# Patient Record
Sex: MALE | Race: OTHER | URBAN - METROPOLITAN AREA
[De-identification: names, ages, dates, MRNs, and addresses within clinical notes are randomized per-mention and may not be internally consistent; named-entity substitution may affect disease eponyms.]

---

## 2018-07-17 ENCOUNTER — EMERGENCY (EMERGENCY)
Facility: HOSPITAL | Age: 28
LOS: 1 days | Discharge: HOME | End: 2018-07-17
Attending: EMERGENCY MEDICINE
Payer: COMMERCIAL

## 2018-07-17 VITALS
RESPIRATION RATE: 18 BRPM | DIASTOLIC BLOOD PRESSURE: 94 MMHG | TEMPERATURE: 98 F | OXYGEN SATURATION: 100 % | SYSTOLIC BLOOD PRESSURE: 141 MMHG | HEART RATE: 72 BPM

## 2018-07-17 PROCEDURE — 99283 EMERGENCY DEPT VISIT LOW MDM: CPT

## 2018-07-17 PROCEDURE — 99283 EMERGENCY DEPT VISIT LOW MDM: CPT | Mod: 25

## 2018-07-17 NOTE — ED ADULT TRIAGE NOTE - CHIEF COMPLAINT QUOTE
Patient reports left sided abdominal pain radiating to the left groin. Patient was diagnosed with an inguinal hernia and is scheduled for surgery. Patient reports constipation.

## 2018-07-18 VITALS
SYSTOLIC BLOOD PRESSURE: 131 MMHG | RESPIRATION RATE: 16 BRPM | HEART RATE: 70 BPM | TEMPERATURE: 98 F | DIASTOLIC BLOOD PRESSURE: 78 MMHG | OXYGEN SATURATION: 100 %

## 2018-07-18 NOTE — ED ADULT NURSE NOTE - CHPI ED SYMPTOMS NEG
no abdominal distension/no blood in stool/no fever/no hematuria/no diarrhea/no nausea/no chills/no vomiting/no dysuria/no burning urination

## 2018-07-18 NOTE — ED PROVIDER NOTE - CARE PLAN
Principal Discharge DX:	Inguinal hernia  Assessment and plan of treatment:	Take all medications as directed.  For pain take Ibuprofen (Motrin, Advil) 400mg-600mg every 6-8 hours or Acetaminophen (Tylenol) 250mg-650mg every 6-8 hours.  Follow up with your primary physician in 3-4 days. If needed call [phone] to find a primary care physician or call  [phone] to schedule an appointment with the general medicine clinic.   You were given copies of all labs and imaging results from this ER visit, please take them to your appointment.  Return to the ER for worsening symptoms or any other concerns.

## 2018-07-18 NOTE — ED PROVIDER NOTE - OBJECTIVE STATEMENT
27 yo M no pmh p/w left inguinal pain 2 weeks duration. Was seen at outside hospital and told he has a hernia and was supposed to follow up with general surgery in a week. Pt was concerned today because he had difficulty having a bowel movement twice. Describes pain with certain movements or when he has increased abdominal pressure. Pt denies any nausea or vomiting. Denies and abdominal pain, fever, or chills. Denies urinary sx.

## 2018-07-18 NOTE — ED ADULT NURSE NOTE - OBJECTIVE STATEMENT
27 y/o male no pmh presenting to ED c/o left inguinal pain x 2 weeks. Was seen at outside hospital and told he has a hernia and was supposed to follow up with general surgery in a week. Pt was concerned today because he now has difficulty having BM. Pain is worse with movement. At this time denies chest pain, sob, ha, n/v/d, abdominal pain, f/c, urinary symptoms, hematuria. A&Ox4 gross neuro intact, lungs cta bilaterally, no difficulty speaking in complete sentences, s1s2 heart sounds heard, pulses x 4, mcneal x4, abdomen soft nontender nondistended, skin intact. Safety and comfort measures maintained. Patient undressed and placed into gown, call bell in hand and side rails up for safety. warm blanket provided, vital signs stable, pt in no acute distress.

## 2018-07-18 NOTE — ED PROVIDER NOTE - ATTENDING CONTRIBUTION TO CARE
28M p/w left side inguinal pain (seen at [facility] 2 weeks ago and advised to f/u with outpt surgery for elective hernia repair) intermittent for several weeks. Had worsening pain with strained bowel movement today. No N/V/D. No abdominal pain. No Testicular pain. Afebrile. Lungs CTA. Heart RRR. Abdomen soft NTND. No CVA TTP. Left inguinal hernia on exam reducible. 28M p/w left side inguinal pain (seen at [facility]SH 2 weeks ago and advised to f/u with outpt surgery for elective hernia repair) intermittent for several weeks. Had worsening pain with strained bowel movement today. No N/V/D. No abdominal pain. No Testicular pain. Afebrile. Lungs CTA. Heart RRR. Abdomen soft NTND. No CVA TTP. No palpable inguinal hernia on exam, Testicular exam unremarkable. Advised stool softeners OTC and f/u general surgery outpt.

## 2018-07-18 NOTE — ED PROVIDER NOTE - MEDICAL DECISION MAKING DETAILS
Hernia vs muscular strain, no evidence of hernia on PE, benign abdominal exam, pt well appearing, has follow up care with surgery already provided   Gloria Joiner, PGY-2 EM

## 2018-07-18 NOTE — ED PROVIDER NOTE - PLAN OF CARE
Take all medications as directed.  For pain take Ibuprofen (Motrin, Advil) 400mg-600mg every 6-8 hours or Acetaminophen (Tylenol) 250mg-650mg every 6-8 hours.  Follow up with your primary physician in 3-4 days. If needed call [phone] to find a primary care physician or call  [phone] to schedule an appointment with the general medicine clinic.   You were given copies of all labs and imaging results from this ER visit, please take them to your appointment.  Return to the ER for worsening symptoms or any other concerns.

## 2018-07-23 PROBLEM — Z00.00 ENCOUNTER FOR PREVENTIVE HEALTH EXAMINATION: Status: ACTIVE | Noted: 2018-07-23

## 2018-07-25 ENCOUNTER — APPOINTMENT (OUTPATIENT)
Dept: SURGERY | Facility: CLINIC | Age: 28
End: 2018-07-25

## 2018-08-06 ENCOUNTER — APPOINTMENT (OUTPATIENT)
Dept: SURGERY | Facility: CLINIC | Age: 28
End: 2018-08-06